# Patient Record
Sex: FEMALE | Race: WHITE | NOT HISPANIC OR LATINO | URBAN - NONMETROPOLITAN AREA
[De-identification: names, ages, dates, MRNs, and addresses within clinical notes are randomized per-mention and may not be internally consistent; named-entity substitution may affect disease eponyms.]

---

## 2020-11-03 ENCOUNTER — WEB ENCOUNTER (OUTPATIENT)
Dept: URBAN - NONMETROPOLITAN AREA CLINIC 2 | Facility: CLINIC | Age: 50
End: 2020-11-03

## 2020-11-03 ENCOUNTER — OFFICE VISIT (OUTPATIENT)
Dept: URBAN - NONMETROPOLITAN AREA CLINIC 2 | Facility: CLINIC | Age: 50
End: 2020-11-03
Payer: COMMERCIAL

## 2020-11-03 DIAGNOSIS — R10.31 RIGHT LOWER QUADRANT ABDOMINAL PAIN: ICD-10-CM

## 2020-11-03 DIAGNOSIS — R10.84 ABDOMINAL PAIN, GENERALIZED: ICD-10-CM

## 2020-11-03 DIAGNOSIS — K21.9 GERD (GASTROESOPHAGEAL REFLUX DISEASE): ICD-10-CM

## 2020-11-03 DIAGNOSIS — Z83.71 FAMILY HISTORY OF COLONIC POLYPS: ICD-10-CM

## 2020-11-03 DIAGNOSIS — K59.01 CONSTIPATION BY DELAYED COLONIC TRANSIT: ICD-10-CM

## 2020-11-03 DIAGNOSIS — R93.5 ABNORMAL ABDOMINAL CT SCAN: ICD-10-CM

## 2020-11-03 DIAGNOSIS — Q43.0 MECKEL DIVERTICULUM: ICD-10-CM

## 2020-11-03 PROCEDURE — 99214 OFFICE O/P EST MOD 30 MIN: CPT | Performed by: INTERNAL MEDICINE

## 2020-11-03 PROCEDURE — 3017F COLORECTAL CA SCREEN DOC REV: CPT | Performed by: INTERNAL MEDICINE

## 2020-11-03 PROCEDURE — G8482 FLU IMMUNIZE ORDER/ADMIN: HCPCS | Performed by: INTERNAL MEDICINE

## 2020-11-03 PROCEDURE — G8427 DOCREV CUR MEDS BY ELIG CLIN: HCPCS | Performed by: INTERNAL MEDICINE

## 2020-11-03 RX ORDER — FAMOTIDINE 20 MG/1
TAKE 1 TABLET (20 MG) BY ORAL ROUTE ONCE DAILY AT BEDTIME FOR 30 DAYS TABLET ORAL 1
Qty: 30 | Refills: 11 | Status: ACTIVE | COMMUNITY
Start: 2020-05-29 | End: 2021-05-24

## 2020-11-03 RX ORDER — ONDANSETRON 4 MG/1
DISSOLVE  1 TABLET BY ORAL ROUTE EVERY 6 HOURS AS NEEDED  NAUSEA TABLET, ORALLY DISINTEGRATING ORAL
Qty: 30 | Refills: 2 | Status: ACTIVE | COMMUNITY
Start: 2020-05-29

## 2021-05-06 ENCOUNTER — DASHBOARD ENCOUNTERS (OUTPATIENT)
Age: 51
End: 2021-05-06

## 2021-05-06 ENCOUNTER — OFFICE VISIT (OUTPATIENT)
Dept: URBAN - NONMETROPOLITAN AREA CLINIC 2 | Facility: CLINIC | Age: 51
End: 2021-05-06
Payer: COMMERCIAL

## 2021-05-06 VITALS
HEIGHT: 65 IN | HEART RATE: 102 BPM | DIASTOLIC BLOOD PRESSURE: 86 MMHG | TEMPERATURE: 96.4 F | SYSTOLIC BLOOD PRESSURE: 122 MMHG | BODY MASS INDEX: 25.72 KG/M2 | WEIGHT: 154.4 LBS

## 2021-05-06 DIAGNOSIS — R10.31 RIGHT LOWER QUADRANT ABDOMINAL PAIN: ICD-10-CM

## 2021-05-06 DIAGNOSIS — Q43.0 MECKEL DIVERTICULUM: ICD-10-CM

## 2021-05-06 DIAGNOSIS — Z83.71 FAMILY HISTORY OF COLONIC POLYPS: ICD-10-CM

## 2021-05-06 DIAGNOSIS — K21.9 GERD (GASTROESOPHAGEAL REFLUX DISEASE): ICD-10-CM

## 2021-05-06 DIAGNOSIS — K59.01 CONSTIPATION BY DELAYED COLONIC TRANSIT: ICD-10-CM

## 2021-05-06 PROBLEM — 37373007: Status: ACTIVE | Noted: 2020-11-03

## 2021-05-06 PROCEDURE — 99214 OFFICE O/P EST MOD 30 MIN: CPT | Performed by: INTERNAL MEDICINE

## 2021-05-06 RX ORDER — ONDANSETRON 4 MG/1
DISSOLVE  1 TABLET BY ORAL ROUTE EVERY 6 HOURS AS NEEDED  NAUSEA TABLET, ORALLY DISINTEGRATING ORAL
Qty: 30 | Refills: 2 | Status: ACTIVE | COMMUNITY
Start: 2020-05-29

## 2021-05-06 RX ORDER — FAMOTIDINE 20 MG/1
TAKE 1 TABLET (20 MG) BY ORAL ROUTE ONCE DAILY AT BEDTIME FOR 30 DAYS TABLET ORAL 1
Qty: 30 | Refills: 11 | Status: ACTIVE | COMMUNITY
Start: 2020-05-29 | End: 2021-05-24

## 2021-05-06 NOTE — HPI-TODAY'S VISIT:
12/9/19 Ms Dottie Romano is here for abdominal pain, constipation and reflux. WE saw her several years ago and did an EGD and colonoscopy.  These were fairly normal.  She had a CT scan with a questionable meckles diverticulum.  She saw Dr. Muñoz, and by that time, her pain was better on cymbalta.  She is now off cymbalta, and her constipation and abdominal pain are worse.  She has also been having reflux again with CRYSTAL pain.  She took OTC prevacid, and her symptoms improved.  They worsened again when she came off of the medication.    5/29/20 Ms. Romano presents today for f/u of abdominal pain, constipation, and reflux.  She is doing a little better.  She is changing from cymbalta to lexapro due to side effects.  She has weaned off of the protonix, and she is taking pepcid prn.  Overall, she is doing much better.  She continues to have some mild reflux and some mild abdominal pain, but her symptoms are under much better control.   11/3/20 The patient presents today for follow-up of abdominal pain, constipation, and reflux.  Since our last visit, she has had surgery by her OB/GYN.  He did not find her Meckel's diverticulum.  She does continue to have some right lower quadrant abdominal pain, but this has improved since she has been on Cymbalta.  She feels that her bowels have been much more regular.  She will take MiraLAX and fiber when she needs it.  Her reflux is also been controlled with famotidine as needed.  She is now off her PPI.  She is concerned, because she was diagnosed with lobular breast cancer, and this can metastasize to the GI tract.  Overall, her GI symptoms have improved today.   5/6/2021  Dottie presents for follow up of constipation, history of ? Meckel's diverticulum, and history of breast cancer. She is doing well today with no GI complaints on miralax as needed and cymbalta. Her CT shows Meckel's diverticulum on CT imaging in 2017. Her EGD/Colonoscopy was in 2019 and normal. Her Ex-lap in 2019 shows no Meckle's. Today she has no abdominal pain and doing well. She has switched her breast cancer care to [facility]. MB

## 2025-03-14 ENCOUNTER — LAB OUTSIDE AN ENCOUNTER (OUTPATIENT)
Dept: URBAN - NONMETROPOLITAN AREA CLINIC 2 | Facility: CLINIC | Age: 55
End: 2025-03-14

## 2025-03-14 ENCOUNTER — OFFICE VISIT (OUTPATIENT)
Dept: URBAN - NONMETROPOLITAN AREA CLINIC 2 | Facility: CLINIC | Age: 55
End: 2025-03-14
Payer: COMMERCIAL

## 2025-03-14 VITALS
DIASTOLIC BLOOD PRESSURE: 82 MMHG | SYSTOLIC BLOOD PRESSURE: 138 MMHG | HEIGHT: 65 IN | BODY MASS INDEX: 25.83 KG/M2 | WEIGHT: 155 LBS | HEART RATE: 105 BPM

## 2025-03-14 DIAGNOSIS — R19.4 CHANGE IN BOWEL HABITS: ICD-10-CM

## 2025-03-14 DIAGNOSIS — Z83.710 FAMILY HISTORY OF ADENOMATOUS AND SERRATED POLYPS: ICD-10-CM

## 2025-03-14 DIAGNOSIS — K21.9 GERD (GASTROESOPHAGEAL REFLUX DISEASE): ICD-10-CM

## 2025-03-14 DIAGNOSIS — A04.8 H. PYLORI INFECTION: ICD-10-CM

## 2025-03-14 DIAGNOSIS — R11.0 NAUSEA: ICD-10-CM

## 2025-03-14 DIAGNOSIS — Q43.0 MECKEL DIVERTICULUM: ICD-10-CM

## 2025-03-14 DIAGNOSIS — K59.01 CONSTIPATION BY DELAYED COLONIC TRANSIT: ICD-10-CM

## 2025-03-14 PROBLEM — 429969003: Status: ACTIVE | Noted: 2025-03-14

## 2025-03-14 PROBLEM — 422587007: Status: ACTIVE | Noted: 2025-03-14

## 2025-03-14 PROBLEM — 88111009: Status: ACTIVE | Noted: 2025-03-14

## 2025-03-14 PROBLEM — 721730009: Status: ACTIVE | Noted: 2025-03-14

## 2025-03-14 PROCEDURE — 99214 OFFICE O/P EST MOD 30 MIN: CPT | Performed by: NURSE PRACTITIONER

## 2025-03-14 RX ORDER — ONDANSETRON HYDROCHLORIDE 4 MG/1
1 TABLET TABLET, FILM COATED ORAL
Qty: 60 TABLET | Refills: 3 | OUTPATIENT
Start: 2025-03-14

## 2025-03-14 RX ORDER — ONDANSETRON 4 MG/1
DISSOLVE  1 TABLET BY ORAL ROUTE EVERY 6 HOURS AS NEEDED  NAUSEA TABLET, ORALLY DISINTEGRATING ORAL
Qty: 30 | Refills: 2 | Status: DISCONTINUED | COMMUNITY
Start: 2020-05-29

## 2025-03-14 NOTE — PHYSICAL EXAM NECK/THYROID:
normal appearance , no deformities , trachea midline
[Follow-up Visit ___] : a follow-up visit  for [unfilled]

## 2025-03-14 NOTE — HPI-TODAY'S VISIT:
12/9/19 Ms Dottie Romano is here for abdominal pain, constipation and reflux. WE saw her several years ago and did an EGD and colonoscopy.  These were fairly normal.  She had a CT scan with a questionable meckles diverticulum.  She saw Dr. Muñoz, and by that time, her pain was better on cymbalta.  She is now off cymbalta, and her constipation and abdominal pain are worse.  She has also been having reflux again with CRYSTAL pain.  She took OTC prevacid, and her symptoms improved.  They worsened again when she came off of the medication.    5/29/20 Ms. Romano presents today for f/u of abdominal pain, constipation, and reflux.  She is doing a little better.  She is changing from cymbalta to lexapro due to side effects.  She has weaned off of the protonix, and she is taking pepcid prn.  Overall, she is doing much better.  She continues to have some mild reflux and some mild abdominal pain, but her symptoms are under much better control.   11/3/20 The patient presents today for follow-up of abdominal pain, constipation, and reflux.  Since our last visit, she has had surgery by her OB/GYN.  He did not find her Meckel's diverticulum.  She does continue to have some right lower quadrant abdominal pain, but this has improved since she has been on Cymbalta.  She feels that her bowels have been much more regular.  She will take MiraLAX and fiber when she needs it.  Her reflux is also been controlled with famotidine as needed.  She is now off her PPI.  She is concerned, because she was diagnosed with lobular breast cancer, and this can metastasize to the GI tract.  Overall, her GI symptoms have improved today.   5/6/2021  Dottie presents for follow up of constipation, history of ? Meckel's diverticulum, and history of breast cancer. She is doing well today with no GI complaints on miralax as needed and cymbalta. Her CT shows Meckel's diverticulum on CT imaging in 2017. Her EGD/Colonoscopy was in 2019 and normal. Her Ex-lap in 2019 shows no Meckle's. Today she has no abdominal pain and doing well. She has switched her breast cancer care to [facility]. MB 3/14/2025 Dottie presents for evaluation of change in bowel habits.  Recently she has had increased constipation with incomplete evacuation.  She does have a history of a rectocele and cystocele.  She is been taking MiraLAX with urgency.  Today she agrees to try low-dose psyllium and half capful of MiraLAX daily.  Will schedule colonoscopy as she has a family history of adenomatous polyps and change in bowel habits.  She also has intermittent nausea and a history of H. pylori treated by her PCP.  Will check an H. pylori breath test, otherwise want her to use Pepcid just as needed for dyspepsia.  She did try GLP-1 therapy last summer which seem to exacerbate the symptoms.  She has been off of it for some time.  Otherwise she is doing fairly well today.  MB

## 2025-03-17 LAB — H PYLORI BREATH TEST: NOT DETECTED

## 2025-06-23 ENCOUNTER — CLAIMS CREATED FROM THE CLAIM WINDOW (OUTPATIENT)
Dept: URBAN - NONMETROPOLITAN AREA SURGERY CENTER 1 | Facility: SURGERY CENTER | Age: 55
End: 2025-06-23
Payer: COMMERCIAL

## 2025-06-23 ENCOUNTER — CLAIMS CREATED FROM THE CLAIM WINDOW (OUTPATIENT)
Dept: URBAN - NONMETROPOLITAN AREA SURGERY CENTER 1 | Facility: SURGERY CENTER | Age: 55
End: 2025-06-23

## 2025-06-23 DIAGNOSIS — R19.4 ALTERATION IN BOWEL ELIMINATION: ICD-10-CM

## 2025-06-23 PROCEDURE — 45378 DIAGNOSTIC COLONOSCOPY: CPT | Performed by: INTERNAL MEDICINE

## 2025-06-23 RX ORDER — ONDANSETRON HYDROCHLORIDE 4 MG/1
1 TABLET TABLET, FILM COATED ORAL
Qty: 60 TABLET | Refills: 3 | Status: ACTIVE | COMMUNITY
Start: 2025-03-14

## 2025-07-25 ENCOUNTER — OFFICE VISIT (OUTPATIENT)
Age: 55
End: 2025-07-25